# Patient Record
Sex: FEMALE | Race: BLACK OR AFRICAN AMERICAN | NOT HISPANIC OR LATINO
[De-identification: names, ages, dates, MRNs, and addresses within clinical notes are randomized per-mention and may not be internally consistent; named-entity substitution may affect disease eponyms.]

---

## 2017-09-19 ENCOUNTER — FORM ENCOUNTER (OUTPATIENT)
Age: 52
End: 2017-09-19

## 2018-09-19 ENCOUNTER — FORM ENCOUNTER (OUTPATIENT)
Age: 53
End: 2018-09-19

## 2018-12-20 ENCOUNTER — FORM ENCOUNTER (OUTPATIENT)
Age: 53
End: 2018-12-20

## 2021-04-15 PROBLEM — Z00.00 ENCOUNTER FOR PREVENTIVE HEALTH EXAMINATION: Status: ACTIVE | Noted: 2021-04-15

## 2021-04-23 ENCOUNTER — APPOINTMENT (OUTPATIENT)
Dept: NEUROSURGERY | Facility: CLINIC | Age: 56
End: 2021-04-23
Payer: COMMERCIAL

## 2021-04-23 VITALS
RESPIRATION RATE: 18 BRPM | TEMPERATURE: 97.5 F | WEIGHT: 140 LBS | OXYGEN SATURATION: 96 % | DIASTOLIC BLOOD PRESSURE: 94 MMHG | SYSTOLIC BLOOD PRESSURE: 143 MMHG | HEIGHT: 60 IN | BODY MASS INDEX: 27.48 KG/M2 | HEART RATE: 68 BPM

## 2021-04-23 DIAGNOSIS — R41.3 OTHER AMNESIA: ICD-10-CM

## 2021-04-23 DIAGNOSIS — Z80.9 FAMILY HISTORY OF MALIGNANT NEOPLASM, UNSPECIFIED: ICD-10-CM

## 2021-04-23 DIAGNOSIS — Z82.49 FAMILY HISTORY OF ISCHEMIC HEART DISEASE AND OTHER DISEASES OF THE CIRCULATORY SYSTEM: ICD-10-CM

## 2021-04-23 DIAGNOSIS — Z83.3 FAMILY HISTORY OF DIABETES MELLITUS: ICD-10-CM

## 2021-04-23 DIAGNOSIS — Z78.9 OTHER SPECIFIED HEALTH STATUS: ICD-10-CM

## 2021-04-23 DIAGNOSIS — R51.9 HEADACHE, UNSPECIFIED: ICD-10-CM

## 2021-04-23 DIAGNOSIS — R42 DIZZINESS AND GIDDINESS: ICD-10-CM

## 2021-04-23 PROCEDURE — 99213 OFFICE O/P EST LOW 20 MIN: CPT

## 2021-04-23 PROCEDURE — 99072 ADDL SUPL MATRL&STAF TM PHE: CPT

## 2021-04-23 RX ORDER — CALCIUM CITRATE/VITAMIN D3 315MG-6.25
TABLET ORAL
Refills: 0 | Status: ACTIVE | COMMUNITY

## 2021-04-23 NOTE — REVIEW OF SYSTEMS
[Chills] : chills [Feeling Tired] : feeling tired [As Noted in HPI] : as noted in HPI [Memory Lapses or Loss] : memory loss [Decr. Concentrating Ability] : decreased concentrating ability [Migraine Headache] : migraine headaches [Constipation] : constipation [Negative] : Heme/Lymph

## 2021-04-23 NOTE — ASSESSMENT
[FreeTextEntry1] : Given new and worsening severity of headaches affecting her work and quality of life, recommend MRI brain with and without contrast to rule out underlying compressive lesion.\par Recommend MRA Head without contrast to rule out vascular lesion \par After MRI brain and MRA head done, return to the office to review results.\par \par Instructed to follow up with ophthalmologist due to worsening RIGHT eye blurry vision - patient will call to schedule appointment at [facility].\par \par Education provided regarding headache prevention and treatments including good diet, adequate fluid intake, avoidance of stimulants caffeine and alcohol, stress reduction methods/techniques, and exercise.\par Instructed to report to ED or notify MD for any worsening neurological s/s.\par \par Patient verbalizes agreement and understanding with plan of care.\par

## 2021-04-23 NOTE — PHYSICAL EXAM
[General Appearance - Alert] : alert [General Appearance - In No Acute Distress] : in no acute distress [Oriented To Time, Place, And Person] : oriented to person, place, and time [Cranial Nerves Oculomotor (III)] : extraocular motion intact [Cranial Nerves Optic (II)] : visual acuity intact bilaterally,  pupils equal round and reactive to light [Cranial Nerves Trigeminal (V)] : facial sensation intact symmetrically [Cranial Nerves Facial (VII)] : face symmetrical [Cranial Nerves Vestibulocochlear (VIII)] : hearing was intact bilaterally [Cranial Nerves Glossopharyngeal (IX)] : tongue and palate midline [Cranial Nerves Hypoglossal (XII)] : there was no tongue deviation with protrusion [Cranial Nerves Accessory (XI - Cranial And Spinal)] : head turning and shoulder shrug symmetric [Motor Tone] : muscle tone was normal in all four extremities [Motor Strength] : muscle strength was normal in all four extremities [Sclera] : the sclera and conjunctiva were normal [Outer Ear] : the ears and nose were normal in appearance [Neck Appearance] : the appearance of the neck was normal [] : no respiratory distress [Abnormal Walk] : normal gait [Skin Color & Pigmentation] : normal skin color and pigmentation

## 2021-04-23 NOTE — HISTORY OF PRESENT ILLNESS
[de-identified] : 55 year old woman with no pertinent past medical history who presents today with chief complaint of worsening headaches. \par \par Ms. Hollis states that she has suffered from migraines for many years. However, over the last several months, she states headaches have been more severe and feel different than usual. She has been prescribed Relpax in the past, which she continues to take but it does not improve headaches. She describes headache as "frontal" and more on the RIGHT side of her head. Headaches are accompanied by dizziness/lightheadedness. Headaches are affecting her quality of life and she has been unable to work at times due to severity of headaches. She also reports RIGHT eye blurry vision. She saw an ophthalmologist a year and a half ago and was given glasses for correction but she states her RIGHT visual acuity has worsened over the past year. \par \par She denies positional headaches. Also reports fatigue.\par \par Denies nausea/vomiting. Denies focal weakness, changes in speech. She reports forgetfulness/short term memory loss and difficulty focusing.\par Denies trauma.

## 2021-04-28 ENCOUNTER — APPOINTMENT (OUTPATIENT)
Dept: MRI IMAGING | Facility: HOSPITAL | Age: 56
End: 2021-04-28
Payer: COMMERCIAL

## 2021-04-28 ENCOUNTER — RESULT REVIEW (OUTPATIENT)
Age: 56
End: 2021-04-28

## 2021-04-28 ENCOUNTER — OUTPATIENT (OUTPATIENT)
Dept: OUTPATIENT SERVICES | Facility: HOSPITAL | Age: 56
LOS: 1 days | End: 2021-04-28
Payer: COMMERCIAL

## 2021-04-28 PROCEDURE — 70544 MR ANGIOGRAPHY HEAD W/O DYE: CPT | Mod: 26,59

## 2021-04-28 PROCEDURE — A9585: CPT

## 2021-04-28 PROCEDURE — 70553 MRI BRAIN STEM W/O & W/DYE: CPT

## 2021-04-28 PROCEDURE — 70553 MRI BRAIN STEM W/O & W/DYE: CPT | Mod: 26

## 2021-04-28 PROCEDURE — 70544 MR ANGIOGRAPHY HEAD W/O DYE: CPT

## 2021-04-29 ENCOUNTER — APPOINTMENT (OUTPATIENT)
Dept: HEMATOLOGY ONCOLOGY | Facility: CLINIC | Age: 56
End: 2021-04-29

## 2021-04-29 ENCOUNTER — NON-APPOINTMENT (OUTPATIENT)
Age: 56
End: 2021-04-29

## 2021-05-27 ENCOUNTER — APPOINTMENT (OUTPATIENT)
Dept: OTOLARYNGOLOGY | Facility: CLINIC | Age: 56
End: 2021-05-27
Payer: COMMERCIAL

## 2021-05-27 DIAGNOSIS — Z87.19 PERSONAL HISTORY OF OTHER DISEASES OF THE DIGESTIVE SYSTEM: ICD-10-CM

## 2021-05-27 DIAGNOSIS — G43.909 MIGRAINE, UNSPECIFIED, NOT INTRACTABLE, W/OUT STATUS MIGRAINOSUS: ICD-10-CM

## 2021-05-27 DIAGNOSIS — K57.90 DIVERTICULOSIS OF INTESTINE, PART UNSPECIFIED, W/OUT PERFORATION OR ABSCESS W/OUT BLEEDING: ICD-10-CM

## 2021-05-27 PROCEDURE — 69210 REMOVE IMPACTED EAR WAX UNI: CPT

## 2021-05-27 PROCEDURE — 99204 OFFICE O/P NEW MOD 45 MIN: CPT | Mod: 25

## 2021-05-27 PROCEDURE — 99072 ADDL SUPL MATRL&STAF TM PHE: CPT

## 2021-05-27 RX ORDER — PANTOPRAZOLE 40 MG/1
40 TABLET, DELAYED RELEASE ORAL
Refills: 0 | Status: ACTIVE | COMMUNITY

## 2021-05-27 NOTE — HISTORY OF PRESENT ILLNESS
[de-identified] : 55F here for initial evaluation.\par \par Referred by Dr. Mason for incidentally found parotid lesions on MRI done for headaches.\par She c/o jaw pain over the past few months, made worse by chewing. She has difficulty at the dentist keeping her mouth open. She does not think she grinds her teeth.\par There is no facial weakness or numbness, no dry mouth, no difficulty eating, breathing, swallowing or talking.\par She also builds up ear wax.\par \par MRI Head 4/28/21:\par Partially imaged T2 hyperintense enhancing foci within the bilateral parotid gland, left greater than right, nonspecific, may represent prominent intraparotid lymph nodes versus parotid soft tissue lesions.\par \par ROS otherwise unremarkable.

## 2021-05-27 NOTE — CONSULT LETTER
[Dear  ___] : Dear  [unfilled], [Courtesy Letter:] : I had the pleasure of seeing your patient, [unfilled], in my office today. [Consult Closing:] : Thank you very much for allowing me to participate in the care of this patient.  If you have any questions, please do not hesitate to contact me. [Sincerely,] : Sincerely, [FreeTextEntry3] : Bari Ball MD\par Department of Otolaryngology - Head and Neck Surgery\par [facility]

## 2021-05-27 NOTE — DATA REVIEWED
[de-identified] : MRI Head 4/28/21:\par 55F here for initial evaluation.\par \par Referred by Dr. Mason for incidentally found parotid lesions on MRI done for headaches.\par She c/o jaw pain over the past few months, made worse by chewing. She has difficulty at the dentist keeping her mouth open. She does not think she grinds her teeth.\par There is no facial weakness or numbness, no dry mouth, no difficulty eating, breathing, swallowing or talking.\par She also builds up ear wax.\par \par MRI Head 4/28/21:\par Partially imaged T2 hyperintense enhancing foci within the bilateral parotid gland, left greater than right, nonspecific, may represent prominent intraparotid lymph nodes versus parotid soft tissue lesions.\par

## 2021-05-27 NOTE — PHYSICAL EXAM
[Midline] : trachea located in midline position [Normal] : no rashes [de-identified] : soft, no masses/lesions [de-identified] : soft [FreeTextEntry1] : Au: eac narrow w moderate cerumen removed w suction. TM intact and mobile, ME clear

## 2021-05-27 NOTE — ASSESSMENT
[FreeTextEntry1] : 55F referred by Dr. Mason for incidentally found parotid lesions on MRI done for headaches. She c/o jaw pain over the past few months, made worse by chewing. She has difficulty at the dentist keeping her mouth open. She does not think she grinds her teeth. There is no facial weakness or numbness, no dry mouth, no difficulty eating, breathing, swallowing or talking. Of note, she also builds up ear wax. MRI head shows partially imaged T2 hyperintense enhancing foci within the bilateral parotid gland, left greater than right, nonspecific, which may represent prominent intraparotid lymph nodes versus parotid soft tissue lesions.\par On exam, there is bilateral TMJ pain. Cerumen was removed from either ear w relief. The rest of the head and neck exam is unremarkable.\par Cerumen removed and felt better. Will send for CT neck to further characterize incidental parotid lesions and RTO thereafter.\par

## 2021-06-02 ENCOUNTER — APPOINTMENT (OUTPATIENT)
Dept: CT IMAGING | Facility: HOSPITAL | Age: 56
End: 2021-06-02

## 2021-07-02 ENCOUNTER — RESULT REVIEW (OUTPATIENT)
Age: 56
End: 2021-07-02

## 2021-07-02 ENCOUNTER — OUTPATIENT (OUTPATIENT)
Dept: OUTPATIENT SERVICES | Facility: HOSPITAL | Age: 56
LOS: 1 days | End: 2021-07-02

## 2021-07-02 ENCOUNTER — APPOINTMENT (OUTPATIENT)
Dept: CT IMAGING | Facility: CLINIC | Age: 56
End: 2021-07-02
Payer: COMMERCIAL

## 2021-07-02 PROCEDURE — 70491 CT SOFT TISSUE NECK W/DYE: CPT | Mod: 26

## 2021-07-23 ENCOUNTER — APPOINTMENT (OUTPATIENT)
Dept: OTOLARYNGOLOGY | Facility: CLINIC | Age: 56
End: 2021-07-23
Payer: COMMERCIAL

## 2021-07-23 VITALS
HEIGHT: 60 IN | WEIGHT: 140 LBS | DIASTOLIC BLOOD PRESSURE: 98 MMHG | BODY MASS INDEX: 27.48 KG/M2 | SYSTOLIC BLOOD PRESSURE: 127 MMHG | HEART RATE: 78 BPM | TEMPERATURE: 98.7 F

## 2021-07-23 DIAGNOSIS — K11.9 DISEASE OF SALIVARY GLAND, UNSPECIFIED: ICD-10-CM

## 2021-07-23 PROCEDURE — 99072 ADDL SUPL MATRL&STAF TM PHE: CPT

## 2021-07-23 PROCEDURE — 99214 OFFICE O/P EST MOD 30 MIN: CPT

## 2021-07-23 NOTE — ASSESSMENT
[FreeTextEntry1] : 55F referred by Dr. Mason for incidentally found parotid lesions on MRI done for headaches. There are no new issues since last seen 2 months ago. She c/o jaw pain over the past few months, made worse by chewing. She has difficulty at the dentist keeping her mouth open. She does not think she grinds her teeth. There is no facial weakness or numbness, no dry mouth, no difficulty eating, breathing, swallowing or talking. MRI head shows partially imaged T2 hyperintense enhancing foci within the bilateral parotid gland, left greater than right, nonspecific, which may represent prominent intraparotid lymph nodes versus parotid soft tissue lesions. CT Neck 7/2/21 shows no parotid mass or acute sialadenitis and findings on MR likely reflected small benign intraparotid lymph nodes not visible on CT. On exam, there is bilateral TMJ pain. The rest of the head and neck exam is unremarkable.\par Lesions on MRI are benign intraparotid lymph nodes; pt reasssured, as CT completely normal. For now, no further ENT intervention. RTO as needed.

## 2021-07-23 NOTE — DATA REVIEWED
[de-identified] : CT Neck 7/2/21:\par Findings:\par \par *  Aerodigestive Tract: No discrete nodular or masslike enhancement is identified. There is questionable asymmetric fullness of soft tissue at the left palatine tonsil inferiorly suggested on series 2, image 53 and series 601, image 33. This may be positional, as appearance is more symmetric on the delayed re-angled series #3 and 4, without masslike enhancement or heterogeneity to suggest inflammation/infection. The nasopharynx and larynx appear within normal limits. The oral cavity is grossly unremarkable allowing for dental streak artifact.\par \par *  Lymph Nodes: There are no pathologically enlarged lymph nodes seen on either side of the neck.\par \par *  Salivary Glands: The parotid glands appear within normal limits. There is no discrete intraparotid nodule or mass. Findings on MR are subtle and appear to reflect tiny intraglandular lymph nodes of no clinical concern. Incidentally, the submandibular glands are atrophic, perhaps developmental in etiology as the sublingual glands appear symmetrically prominent in size. No sialolithiasis nor evidence of acute sialadenitis.\par \par *  Thyroid Gland: Normal in size. There are a couple subcentimeter and punctate foci of hypodensity, too small to warrant further imaging investigation with ultrasound.\par \par *  Orbits: No abnormal soft tissue or mass effect.\par \par *  Brain: Limited visualization of the intracranial compartment shows no hydrocephalus or space-occupying lesion.\par \par *  Skull Base: Intact and unremarkable.\par \par *  Paranasal Sinuses: Well ventilated\par \par *  Mastoids: Well ventilated\par \par *  Cervical Spine: Unremarkable\par \par *  Lung Apices: Unremarkable\par \par \par IMPRESSION:\par \par No parotid mass or acute sialadenitis identified. Findings on MR most likely reflected small benign intraparotid lymph nodes not visible on CT.\par \par Questionable fullness at the left inferior tonsillar fossa, though without discrete enhancing mass or other heterogeneity. Correlate with direct inspection.\par \par Incidentally and likely a developmental finding, the submandibular glands are atrophic versus atretic and the sublingual glands are symmetrically hypertrophic.\par \par

## 2021-07-23 NOTE — HISTORY OF PRESENT ILLNESS
[de-identified] : 55F here in followup.\par \par There are no new issues since last seen 2 months ago. She was initially referred by Dr. Mason for incidentally found parotid lesions on MRI done for headaches. She c/o jaw pain over the past few months, made worse by chewing. She has difficulty at the dentist keeping her mouth open. She does not think she grinds her teeth.\par There is no facial weakness or numbness, no dry mouth, no difficulty eating, breathing, swallowing or talking.\par She also builds up ear wax.\par \par MRI Head 4/28/21:\par Partially imaged T2 hyperintense enhancing foci within the bilateral parotid gland, left greater than right, nonspecific, may represent prominent intraparotid lymph nodes versus parotid soft tissue lesions.\par \par CT Neck 7/2/21 (I reviewed images):\par -No parotid mass or acute sialadenitis identified. Findings on MR most likely reflected small benign intraparotid lymph nodes not visible on CT.\par \par ROS otherwise unremarkable.

## 2021-07-23 NOTE — PHYSICAL EXAM
[de-identified] : soft, no masses/lesions [de-identified] : soft [Midline] : trachea located in midline position [Normal] : no rashes [FreeTextEntry1] : Au: eac narrow w mild cerumen. TM intact and mobile, ME clear

## 2021-07-23 NOTE — CONSULT LETTER
[Dear  ___] : Dear  [unfilled], [Courtesy Letter:] : I had the pleasure of seeing your patient, [unfilled], in my office today. [Consult Closing:] : Thank you very much for allowing me to participate in the care of this patient.  If you have any questions, please do not hesitate to contact me. [Sincerely,] : Sincerely, [FreeTextEntry3] : Bari Ball MD\par Department of Otolaryngology - Head and Neck Surgery\par [facility]

## 2021-10-28 ENCOUNTER — TRANSCRIPTION ENCOUNTER (OUTPATIENT)
Age: 56
End: 2021-10-28

## 2021-10-29 ENCOUNTER — OUTPATIENT (OUTPATIENT)
Dept: OUTPATIENT SERVICES | Facility: HOSPITAL | Age: 56
LOS: 1 days | Discharge: HOME | End: 2021-10-29
Payer: COMMERCIAL

## 2021-10-29 ENCOUNTER — RESULT REVIEW (OUTPATIENT)
Age: 56
End: 2021-10-29

## 2021-10-29 PROCEDURE — 88305 TISSUE EXAM BY PATHOLOGIST: CPT | Mod: 26

## 2021-11-01 LAB — SURGICAL PATHOLOGY STUDY: SIGNIFICANT CHANGE UP

## 2021-12-30 ENCOUNTER — NON-APPOINTMENT (OUTPATIENT)
Age: 56
End: 2021-12-30

## 2022-02-09 ENCOUNTER — NON-APPOINTMENT (OUTPATIENT)
Age: 57
End: 2022-02-09

## 2022-02-09 ENCOUNTER — APPOINTMENT (OUTPATIENT)
Dept: BREAST CENTER | Facility: CLINIC | Age: 57
End: 2022-02-09
Payer: COMMERCIAL

## 2022-02-09 ENCOUNTER — RESULT REVIEW (OUTPATIENT)
Age: 57
End: 2022-02-09

## 2022-02-09 VITALS
WEIGHT: 148 LBS | HEIGHT: 60 IN | BODY MASS INDEX: 29.06 KG/M2 | DIASTOLIC BLOOD PRESSURE: 93 MMHG | HEART RATE: 125 BPM | SYSTOLIC BLOOD PRESSURE: 141 MMHG

## 2022-02-09 DIAGNOSIS — N60.02 SOLITARY CYST OF RIGHT BREAST: ICD-10-CM

## 2022-02-09 DIAGNOSIS — R92.8 OTHER ABNORMAL AND INCONCLUSIVE FINDINGS ON DIAGNOSTIC IMAGING OF BREAST: ICD-10-CM

## 2022-02-09 DIAGNOSIS — Z78.9 OTHER SPECIFIED HEALTH STATUS: ICD-10-CM

## 2022-02-09 DIAGNOSIS — N64.59 OTHER SIGNS AND SYMPTOMS IN BREAST: ICD-10-CM

## 2022-02-09 DIAGNOSIS — N60.01 SOLITARY CYST OF RIGHT BREAST: ICD-10-CM

## 2022-02-09 PROCEDURE — 19001 PUNCTURE ASPIR CYST BRST EA: CPT

## 2022-02-09 PROCEDURE — 99214 OFFICE O/P EST MOD 30 MIN: CPT | Mod: 25

## 2022-02-09 PROCEDURE — 19000 PUNCTURE ASPIR CYST BREAST: CPT

## 2022-02-09 RX ORDER — ELETRIPTAN HYDROBROMIDE 40 MG/1
40 TABLET, FILM COATED ORAL
Refills: 0 | Status: DISCONTINUED | COMMUNITY
End: 2022-02-09

## 2022-02-09 RX ORDER — UBROGEPANT 100 MG/1
TABLET ORAL
Refills: 0 | Status: ACTIVE | COMMUNITY

## 2022-02-14 ENCOUNTER — NON-APPOINTMENT (OUTPATIENT)
Age: 57
End: 2022-02-14

## 2022-07-13 ENCOUNTER — APPOINTMENT (OUTPATIENT)
Dept: NUCLEAR MEDICINE | Facility: HOSPITAL | Age: 57
End: 2022-07-13

## 2022-08-09 ENCOUNTER — OUTPATIENT (OUTPATIENT)
Dept: OUTPATIENT SERVICES | Facility: HOSPITAL | Age: 57
LOS: 1 days | End: 2022-08-09
Payer: COMMERCIAL

## 2022-08-09 ENCOUNTER — APPOINTMENT (OUTPATIENT)
Dept: NUCLEAR MEDICINE | Facility: HOSPITAL | Age: 57
End: 2022-08-09

## 2022-08-09 PROCEDURE — 78264 GASTRIC EMPTYING IMG STUDY: CPT | Mod: 26

## 2022-08-09 PROCEDURE — 78264 GASTRIC EMPTYING IMG STUDY: CPT

## 2022-08-09 PROCEDURE — A9541: CPT

## 2024-01-02 PROBLEM — Z78.9 CAFFEINE USE: Status: ACTIVE | Noted: 2024-01-02

## 2024-01-03 ENCOUNTER — APPOINTMENT (OUTPATIENT)
Dept: GASTROENTEROLOGY | Facility: CLINIC | Age: 59
End: 2024-01-03
Payer: COMMERCIAL

## 2024-01-03 DIAGNOSIS — Z78.9 OTHER SPECIFIED HEALTH STATUS: ICD-10-CM

## 2024-01-03 PROCEDURE — 99443: CPT

## 2024-01-03 RX ORDER — CHROMIUM 200 MCG
TABLET ORAL
Refills: 0 | Status: ACTIVE | COMMUNITY

## 2024-01-03 RX ORDER — PANCRELIPASE LIPASE, PANCRELIPASE PROTEASE, PANCRELIPASE AMYLASE 252600; 60000; 189600 [USP'U]/1; [USP'U]/1; [USP'U]/1
CAPSULE, DELAYED RELEASE ORAL
Refills: 0 | Status: ACTIVE | COMMUNITY

## 2024-01-17 ENCOUNTER — APPOINTMENT (OUTPATIENT)
Dept: GASTROENTEROLOGY | Facility: CLINIC | Age: 59
End: 2024-01-17
Payer: COMMERCIAL

## 2024-01-17 VITALS — BODY MASS INDEX: 27.48 KG/M2 | WEIGHT: 140 LBS | HEIGHT: 60 IN

## 2024-01-17 DIAGNOSIS — R10.9 UNSPECIFIED ABDOMINAL PAIN: ICD-10-CM

## 2024-01-17 DIAGNOSIS — K83.4 SPASM OF SPHINCTER OF ODDI: ICD-10-CM

## 2024-01-17 PROCEDURE — 99214 OFFICE O/P EST MOD 30 MIN: CPT

## 2024-01-17 RX ORDER — HYOSCYAMINE SULFATE 0.12 MG/1
0.12 TABLET SUBLINGUAL 3 TIMES DAILY
Qty: 90 | Refills: 0 | Status: ACTIVE | COMMUNITY
Start: 2024-01-17 | End: 1900-01-01

## 2024-01-17 NOTE — HISTORY OF PRESENT ILLNESS
[FreeTextEntry1] : 58 yr old female who was referred here by Dr. Thomas for an EUS of the upper abdomen to further assess abdominal pain. She stated that she has been experiencing periodic upper abdominal pain over the past 2 years, described as sometimes sharp, squeezing, and stabbing pain. She last had the pain a week ago, not as severe as in August which required hospitalization in [place]. She had an US of Abdomen, CT Scan, HIDA scan, MRCP which apparently were all unremarkable. However, the pain in August was associated with nausea, vomiting, and increased LFTs. .

## 2024-01-17 NOTE — ASSESSMENT
[FreeTextEntry1] : Patient is a 59 y/o female who was referred here by Dr. Rios for an EUS of the upper abdomen to further assess abdominal pain. She stated that she has been experiencing periodic upper abdominal pain over the past 2 years, described as sometimes sharp, squeezing, and stabbing pain. She last had the pain a week ago, not as severe as in August which required hospitalization in [place]. She had an US of Abdomen, CT Scan, HIDA scan, MRCP which apparently were all unremarkable. She had stools studies suggestive of EPI as noted in Dr rios records. Currently she is without any issues.   Intermittent Upper abdominal pain over the past 2 yrs with increased LFTs 8/23, ?Sphincter of Juan Daniel - MRI, U/S and labs/imaging reviewed - Clinically well appearing - EPI on stool studies   - Attack are infrequent- Trial of Levsin - If attacks persist will plan ERCP- f/U 6 months

## 2024-01-17 NOTE — ASSESSMENT
[FreeTextEntry1] : 58 yr old female who was referred here by Dr. Thomas for an EUS of the upper abdomen to further assess abdominal pain. She stated that she has been experiencing periodic upper abdominal pain over the past 2 years, described as sometimes sharp, squeezing, and stabbing pain. She last had the pain a week ago, not as severe as in August which required hospitalization in [place]. She had an US of Abdomen, CT Scan, HIDA scan, MRCP which apparently were all unremarkable. However, the pain in August was associated with nausea, vomiting, and increased LFTs.   Intermittent Upper abdominal pain over the past 2 yrs with increased LFTs 8/23, ?Sphincter of Juan Daniel - Need to get reports of the MRCP, Capsule from 10/20/23, HIDA scan, labs, and last medical note from Dr. Thomas - Apparently, all testing to date has been unrevealing - RTC in person in 2-3 weeks due to the complicated nature of thi case

## 2024-01-17 NOTE — REVIEW OF SYSTEMS
[Abdominal Pain] : abdominal pain [Vomiting] : vomiting [Negative] : Heme/Lymph [Constipation] : no constipation [Diarrhea] : no diarrhea [Heartburn] : no heartburn [Melena (black stool)] : no melena [Bleeding] : no bleeding [Fecal Incontinence (soiling)] : no fecal incontinence [Bloating (gassiness)] : no bloating

## 2024-01-17 NOTE — REASON FOR VISIT
[Home] : at home, [unfilled] , at the time of the visit. [Medical Office: ([facility])___] : at the medical office located in  [Verbal consent obtained from patient] : the patient, [unfilled] [Consultation] : a consultation visit [FreeTextEntry4] : Valerie [FreeTextEntry1] : NP - Ref by Dr. Thomas for EUS

## 2024-01-17 NOTE — REVIEW OF SYSTEMS
[Abdominal Pain] : abdominal pain [Vomiting] : vomiting [Negative] : Heme/Lymph [Constipation] : no constipation [Diarrhea] : no diarrhea [Heartburn] : no heartburn [Melena (black stool)] : no melena [Fecal Incontinence (soiling)] : no fecal incontinence [Swollen Glands] : no swollen glands

## 2024-01-25 ENCOUNTER — APPOINTMENT (OUTPATIENT)
Dept: SURGICAL ONCOLOGY | Facility: CLINIC | Age: 59
End: 2024-01-25

## 2024-05-24 ENCOUNTER — APPOINTMENT (OUTPATIENT)
Dept: OTOLARYNGOLOGY | Facility: CLINIC | Age: 59
End: 2024-05-24
Payer: COMMERCIAL

## 2024-05-24 VITALS
HEART RATE: 81 BPM | WEIGHT: 136 LBS | DIASTOLIC BLOOD PRESSURE: 101 MMHG | HEIGHT: 60 IN | SYSTOLIC BLOOD PRESSURE: 139 MMHG | BODY MASS INDEX: 26.7 KG/M2 | TEMPERATURE: 97.3 F

## 2024-05-24 DIAGNOSIS — H61.23 IMPACTED CERUMEN, BILATERAL: ICD-10-CM

## 2024-05-24 PROCEDURE — 99213 OFFICE O/P EST LOW 20 MIN: CPT | Mod: 25

## 2024-05-24 NOTE — PHYSICAL EXAM
[de-identified] : soft, no masses/lesions [de-identified] : soft [de-identified] : ++pain on palpation over left side [Midline] : trachea located in midline position [Normal] : no rashes [FreeTextEntry1] : Au: eac narrow w mild cerumen removed w curet. TM intact and mobile, ME clear

## 2024-05-24 NOTE — DATA REVIEWED
[de-identified] : CT Neck 7/2/21:\par  Findings:\par  \par  *  Aerodigestive Tract: No discrete nodular or masslike enhancement is identified. There is questionable asymmetric fullness of soft tissue at the left palatine tonsil inferiorly suggested on series 2, image 53 and series 601, image 33. This may be positional, as appearance is more symmetric on the delayed re-angled series #3 and 4, without masslike enhancement or heterogeneity to suggest inflammation/infection. The nasopharynx and larynx appear within normal limits. The oral cavity is grossly unremarkable allowing for dental streak artifact.\par  \par  *  Lymph Nodes: There are no pathologically enlarged lymph nodes seen on either side of the neck.\par  \par  *  Salivary Glands: The parotid glands appear within normal limits. There is no discrete intraparotid nodule or mass. Findings on MR are subtle and appear to reflect tiny intraglandular lymph nodes of no clinical concern. Incidentally, the submandibular glands are atrophic, perhaps developmental in etiology as the sublingual glands appear symmetrically prominent in size. No sialolithiasis nor evidence of acute sialadenitis.\par  \par  *  Thyroid Gland: Normal in size. There are a couple subcentimeter and punctate foci of hypodensity, too small to warrant further imaging investigation with ultrasound.\par  \par  *  Orbits: No abnormal soft tissue or mass effect.\par  \par  *  Brain: Limited visualization of the intracranial compartment shows no hydrocephalus or space-occupying lesion.\par  \par  *  Skull Base: Intact and unremarkable.\par  \par  *  Paranasal Sinuses: Well ventilated\par  \par  *  Mastoids: Well ventilated\par  \par  *  Cervical Spine: Unremarkable\par  \par  *  Lung Apices: Unremarkable\par  \par  \par  IMPRESSION:\par  \par  No parotid mass or acute sialadenitis identified. Findings on MR most likely reflected small benign intraparotid lymph nodes not visible on CT.\par  \par  Questionable fullness at the left inferior tonsillar fossa, though without discrete enhancing mass or other heterogeneity. Correlate with direct inspection.\par  \par  Incidentally and likely a developmental finding, the submandibular glands are atrophic versus atretic and the sublingual glands are symmetrically hypertrophic.\par  \par

## 2024-05-24 NOTE — CONSULT LETTER
[Dear  ___] : Dear  [unfilled], [Courtesy Letter:] : I had the pleasure of seeing your patient, [unfilled], in my office today. [Consult Closing:] : Thank you very much for allowing me to participate in the care of this patient.  If you have any questions, please do not hesitate to contact me. [Sincerely,] : Sincerely, [FreeTextEntry3] : Bari Ball MD\par  Department of Otolaryngology - Head and Neck Surgery\par  [facility]

## 2024-05-24 NOTE — ASSESSMENT
[FreeTextEntry1] : 58F here in followup. She c/o left sided jaw and ear pain. This affects her opening her jaw and makes dental work difficult and the pain is made worse by chewing. Her dentist recommended a . There is also clicking on the left side when opening the jaw. There is no facial weakness or numbness, no dry mouth, no difficulty eating, breathing, swallowing or talking. She also builds up ear wax. On exam, there is left sided TMJ pain. Cerumen was removed from both EACs. The rest of the head and neck exam is unremarkable. -TMJ pain - first try conservative measures w soft diet, NSAIDs, compresses, massage and nightguard. If still sx will send to [facility]. -Cerumen removed

## 2024-05-24 NOTE — HISTORY OF PRESENT ILLNESS
[de-identified] : 58F here in followup.  She c/o left sided jaw and ear pain. This affects her opening her jaw and makes dental work difficult and the pain is made worse by chewing. Her dentist recommended a . There is also clicking on the left side when opening the jaw. There is no facial weakness or numbness, no dry mouth, no difficulty eating, breathing, swallowing or talking. She also builds up ear wax.  MRI Head 4/28/21: Partially imaged T2 hyperintense enhancing foci within the bilateral parotid gland, left greater than right, nonspecific, may represent prominent intraparotid lymph nodes versus parotid soft tissue lesions.  CT Neck 7/2/21 (I reviewed images): -No parotid mass or acute sialadenitis identified. Findings on MR most likely reflected small benign intraparotid lymph nodes not visible on CT.  ROS otherwise unremarkable.

## 2024-06-11 ENCOUNTER — APPOINTMENT (OUTPATIENT)
Dept: OTOLARYNGOLOGY | Facility: CLINIC | Age: 59
End: 2024-06-11
Payer: COMMERCIAL

## 2024-06-11 VITALS
HEART RATE: 71 BPM | DIASTOLIC BLOOD PRESSURE: 82 MMHG | OXYGEN SATURATION: 97 % | BODY MASS INDEX: 26.7 KG/M2 | HEIGHT: 60 IN | TEMPERATURE: 96 F | WEIGHT: 136 LBS | SYSTOLIC BLOOD PRESSURE: 125 MMHG

## 2024-06-11 DIAGNOSIS — M26.609 UNSPECIFIED TEMPOROMANDIBULAR JOINT DISORDER: ICD-10-CM

## 2024-06-11 PROCEDURE — 99213 OFFICE O/P EST LOW 20 MIN: CPT

## 2024-06-11 RX ORDER — OLMESARTAN MEDOXOMIL AND HYDROCHLOROTHIAZIDE 40; 25 MG/1; MG/1
TABLET ORAL
Refills: 0 | Status: ACTIVE | COMMUNITY

## 2024-06-11 RX ORDER — AMLODIPINE BESYLATE 2.5 MG/1
2.5 TABLET ORAL
Refills: 0 | Status: DISCONTINUED | COMMUNITY
End: 2024-06-11

## 2024-06-13 NOTE — HISTORY OF PRESENT ILLNESS
[de-identified] : 6/11/24: 58F here complaining of jaw pain. She is having left sided jaw pain that gets worse when she eats and some days she feels like she cannot eat unless she takes tylenol. She also has clicking on the left side when she opens her mouth and does have some ear pain and throat pain as well on that side. No recent dental work. She does have some limitations when opening which vary depending on her symptoms.

## 2024-06-13 NOTE — PHYSICAL EXAM
[de-identified] : +left clicking, masseter tenderness, limited ROM [Normal] : mucosa is normal [de-identified] : Does have some limitations in opening today pain involving left masseter muscle and left temporal region.

## 2024-06-26 ENCOUNTER — APPOINTMENT (OUTPATIENT)
Dept: MRI IMAGING | Facility: HOSPITAL | Age: 59
End: 2024-06-26

## 2024-07-23 PROBLEM — R13.10 DIFFICULTY SWALLOWING: Status: ACTIVE | Noted: 2024-07-23

## 2024-07-23 NOTE — REASON FOR VISIT
[Home] : at home, [unfilled] , at the time of the visit. [Medical Office: ([facility])___] : at the medical office located in  [Verbal consent obtained from patient] : the patient, [unfilled] [FreeTextEntry4] : Valerie [Follow-up] : a follow-up of an existing diagnosis

## 2024-07-24 ENCOUNTER — APPOINTMENT (OUTPATIENT)
Dept: GASTROENTEROLOGY | Facility: CLINIC | Age: 59
End: 2024-07-24
Payer: COMMERCIAL

## 2024-07-24 DIAGNOSIS — R10.9 UNSPECIFIED ABDOMINAL PAIN: ICD-10-CM

## 2024-07-24 PROCEDURE — 99442: CPT

## 2024-07-24 NOTE — HISTORY OF PRESENT ILLNESS
[FreeTextEntry1] : Patient is a 59 y/o female who was referred here by Dr. Thomas for an EUS of the upper abdomen to further assess abdominal pain. She stated that she has been experiencing periodic upper abdominal pain over the past 2 years, described as sometimes sharp, squeezing, and stabbing pain. She last had the pain a week ago, not as severe as in August which required hospitalization in [place]. She had an US of Abdomen, CT Scan, HIDA scan, MRCP which apparently were all unremarkable. She had stools studies suggestive of EPI as noted in Dr Thomas records. Patient notes since last visit still having pain. she was given Levsin which helped. She notes she is averaging 3 times weekly.

## 2024-07-24 NOTE — REVIEW OF SYSTEMS
[Constipation] : no constipation [Diarrhea] : no diarrhea [Heartburn] : no heartburn [Melena (black stool)] : no melena [Fecal Incontinence (soiling)] : no fecal incontinence [Swollen Glands] : no swollen glands

## 2024-07-24 NOTE — ASSESSMENT
[FreeTextEntry1] : Patient is a 57 y/o female who was referred here by Dr. Thomas for an EUS of the upper abdomen to further assess abdominal pain. She stated that she has been experiencing periodic upper abdominal pain over the past 2 years, described as sometimes sharp, squeezing, and stabbing pain. She last had the pain a week ago, not as severe as in August which required hospitalization in [place]. She had an US of Abdomen, CT Scan, HIDA scan, MRCP which apparently were all unremarkable. She had stools studies suggestive of EPI as noted in Dr Thomas records. Currently she is without any issues.   Intermittent Upper abdominal pain over the past 2 yrs with increased LFTs 8/23, ?Sphincter of Juan Daniel - Continue Levsin - No longer on Pantoprazole  - She will journal attacks- would like to hold off on procedure currently  - CMP, Amylase, Lipase ordered  - Follow up 2-3 months

## 2024-08-06 ENCOUNTER — APPOINTMENT (OUTPATIENT)
Dept: OTOLARYNGOLOGY | Facility: CLINIC | Age: 59
End: 2024-08-06

## 2024-08-06 PROCEDURE — 99214 OFFICE O/P EST MOD 30 MIN: CPT

## 2024-08-07 NOTE — DATA REVIEWED
[de-identified] : Open/Closed MRI does not reveal any significant OA of the joints but the left side disc is displaced posteriorly and does not reduce. Likely the anterior disc ligament is impinged leading to her pain and some opening limitation.

## 2024-08-07 NOTE — DATA REVIEWED
[de-identified] : Open/Closed MRI does not reveal any significant OA of the joints but the left side disc is displaced posteriorly and does not reduce. Likely the anterior disc ligament is impinged leading to her pain and some opening limitation.

## 2024-08-07 NOTE — HISTORY OF PRESENT ILLNESS
[de-identified] : 6/11/24: 58F here complaining of jaw pain. She is having left sided jaw pain that gets worse when she eats and some days she feels like she cannot eat unless she takes tylenol. She also has clicking on the left side when she opens her mouth and does have some ear pain and throat pain as well on that side. No recent dental work. She does have some limitations when opening which vary depending on her symptoms. [FreeTextEntry1] : 8/6/24: Patient returns today to review recent MR of TMJ.

## 2024-08-07 NOTE — PHYSICAL EXAM
[de-identified] : +left clicking, masseter tenderness, limited ROM [de-identified] : Does have some limitations in opening today pain involving left masseter muscle and left. No significant pain temporal region.  No significant deviation on open. [Normal] : no rashes

## 2024-08-07 NOTE — PHYSICAL EXAM
[de-identified] : +left clicking, masseter tenderness, limited ROM [de-identified] : Does have some limitations in opening today pain involving left masseter muscle and left. No significant pain temporal region.  No significant deviation on open. [Normal] : no rashes

## 2024-08-07 NOTE — HISTORY OF PRESENT ILLNESS
[de-identified] : 6/11/24: 58F here complaining of jaw pain. She is having left sided jaw pain that gets worse when she eats and some days she feels like she cannot eat unless she takes tylenol. She also has clicking on the left side when she opens her mouth and does have some ear pain and throat pain as well on that side. No recent dental work. She does have some limitations when opening which vary depending on her symptoms. [FreeTextEntry1] : 8/6/24: Patient returns today to review recent MR of TMJ.

## 2024-09-04 DIAGNOSIS — K21.9 GASTRO-ESOPHAGEAL REFLUX DISEASE W/OUT ESOPHAGITIS: ICD-10-CM

## 2024-09-04 RX ORDER — PANTOPRAZOLE 40 MG/1
40 TABLET, DELAYED RELEASE ORAL
Qty: 90 | Refills: 2 | Status: ACTIVE | COMMUNITY
Start: 2024-09-04 | End: 1900-01-01

## 2024-09-18 DIAGNOSIS — K86.89 OTHER SPECIFIED DISEASES OF PANCREAS: ICD-10-CM

## 2024-10-23 ENCOUNTER — APPOINTMENT (OUTPATIENT)
Dept: GASTROENTEROLOGY | Facility: CLINIC | Age: 59
End: 2024-10-23

## 2024-10-23 VITALS — BODY MASS INDEX: 27.48 KG/M2 | WEIGHT: 140 LBS | HEIGHT: 60 IN

## 2024-10-23 DIAGNOSIS — Z78.9 OTHER SPECIFIED HEALTH STATUS: ICD-10-CM

## 2024-10-23 DIAGNOSIS — K21.9 GASTRO-ESOPHAGEAL REFLUX DISEASE W/OUT ESOPHAGITIS: ICD-10-CM

## 2024-10-23 DIAGNOSIS — R10.9 UNSPECIFIED ABDOMINAL PAIN: ICD-10-CM

## 2024-10-23 DIAGNOSIS — K59.00 CONSTIPATION, UNSPECIFIED: ICD-10-CM

## 2024-10-23 DIAGNOSIS — R13.10 DYSPHAGIA, UNSPECIFIED: ICD-10-CM

## 2024-10-23 DIAGNOSIS — S03.00XA DISLOCATION OF JAW, UNSPECIFIED SIDE, INITIAL ENCOUNTER: ICD-10-CM

## 2024-10-23 PROCEDURE — 99213 OFFICE O/P EST LOW 20 MIN: CPT

## 2024-10-23 RX ORDER — OLMESARTAN MEDOXOMIL 20 MG/1
20 TABLET, FILM COATED ORAL
Refills: 0 | Status: ACTIVE | COMMUNITY

## 2024-10-23 RX ORDER — UBROGEPANT 100 MG/1
100 TABLET ORAL
Refills: 0 | Status: ACTIVE | COMMUNITY

## 2025-02-27 ENCOUNTER — RESULT REVIEW (OUTPATIENT)
Age: 60
End: 2025-02-27

## 2025-02-27 ENCOUNTER — OUTPATIENT (OUTPATIENT)
Dept: OUTPATIENT SERVICES | Facility: HOSPITAL | Age: 60
LOS: 1 days | Discharge: HOME | End: 2025-02-27
Payer: COMMERCIAL

## 2025-02-27 ENCOUNTER — TRANSCRIPTION ENCOUNTER (OUTPATIENT)
Age: 60
End: 2025-02-27

## 2025-02-27 VITALS
WEIGHT: 139.99 LBS | TEMPERATURE: 98 F | SYSTOLIC BLOOD PRESSURE: 146 MMHG | OXYGEN SATURATION: 100 % | DIASTOLIC BLOOD PRESSURE: 78 MMHG | HEART RATE: 63 BPM | RESPIRATION RATE: 18 BRPM

## 2025-02-27 VITALS
HEART RATE: 68 BPM | OXYGEN SATURATION: 100 % | DIASTOLIC BLOOD PRESSURE: 87 MMHG | SYSTOLIC BLOOD PRESSURE: 145 MMHG | RESPIRATION RATE: 18 BRPM

## 2025-02-27 DIAGNOSIS — I10 ESSENTIAL (PRIMARY) HYPERTENSION: ICD-10-CM

## 2025-02-27 DIAGNOSIS — K29.50 UNSPECIFIED CHRONIC GASTRITIS WITHOUT BLEEDING: ICD-10-CM

## 2025-02-27 DIAGNOSIS — Z98.890 OTHER SPECIFIED POSTPROCEDURAL STATES: Chronic | ICD-10-CM

## 2025-02-27 DIAGNOSIS — K25.9 GASTRIC ULCER, UNSPECIFIED AS ACUTE OR CHRONIC, WITHOUT HEMORRHAGE OR PERFORATION: ICD-10-CM

## 2025-02-27 DIAGNOSIS — Z98.891 HISTORY OF UTERINE SCAR FROM PREVIOUS SURGERY: Chronic | ICD-10-CM

## 2025-02-27 DIAGNOSIS — Z98.51 TUBAL LIGATION STATUS: Chronic | ICD-10-CM

## 2025-02-27 DIAGNOSIS — R10.11 RIGHT UPPER QUADRANT PAIN: ICD-10-CM

## 2025-02-27 DIAGNOSIS — I47.10 SUPRAVENTRICULAR TACHYCARDIA, UNSPECIFIED: ICD-10-CM

## 2025-02-27 DIAGNOSIS — G43.909 MIGRAINE, UNSPECIFIED, NOT INTRACTABLE, WITHOUT STATUS MIGRAINOSUS: ICD-10-CM

## 2025-02-27 DIAGNOSIS — K21.9 GASTRO-ESOPHAGEAL REFLUX DISEASE WITHOUT ESOPHAGITIS: ICD-10-CM

## 2025-02-27 DIAGNOSIS — R13.10 DYSPHAGIA, UNSPECIFIED: ICD-10-CM

## 2025-02-27 PROCEDURE — 88312 SPECIAL STAINS GROUP 1: CPT | Mod: 26

## 2025-02-27 PROCEDURE — 88312 SPECIAL STAINS GROUP 1: CPT

## 2025-02-27 PROCEDURE — 43239 EGD BIOPSY SINGLE/MULTIPLE: CPT | Mod: XU

## 2025-02-27 PROCEDURE — 88305 TISSUE EXAM BY PATHOLOGIST: CPT | Mod: 26

## 2025-02-27 PROCEDURE — 43237 ENDOSCOPIC US EXAM ESOPH: CPT

## 2025-02-27 PROCEDURE — 88305 TISSUE EXAM BY PATHOLOGIST: CPT

## 2025-02-27 RX ORDER — OLMESARTAN MEDOXOMIL 5 MG/1
1 TABLET, FILM COATED ORAL
Refills: 0 | DISCHARGE

## 2025-02-27 NOTE — ASU DISCHARGE PLAN (ADULT/PEDIATRIC) - FINANCIAL ASSISTANCE
[facility] provides services at a reduced cost to those who are determined to be eligible through [facility]’s financial assistance program. Information regarding [facility]’s financial assistance program can be found by going to [email or web address] or by calling [phone].

## 2025-02-27 NOTE — ASU PATIENT PROFILE, ADULT - NSICDXPASTMEDICALHX_GEN_ALL_CORE_FT
PAST MEDICAL HISTORY:  Chronic GERD     HTN (hypertension)     Migraines     SVT (supraventricular tachycardia)

## 2025-02-27 NOTE — ASU PATIENT PROFILE, ADULT - FALL HARM RISK - UNIVERSAL INTERVENTIONS
Bed in lowest position, wheels locked, appropriate side rails in place/Call bell, personal items and telephone in reach/Instruct patient to call for assistance before getting out of bed or chair/Non-slip footwear when patient is out of bed/[place] to call system/Purposeful Proactive Rounding/Room/bathroom lighting operational, light cord in reach

## 2025-03-14 PROBLEM — G43.909 MIGRAINE, UNSPECIFIED, NOT INTRACTABLE, WITHOUT STATUS MIGRAINOSUS: Chronic | Status: ACTIVE | Noted: 2025-02-27

## 2025-03-14 PROBLEM — K21.9 GASTRO-ESOPHAGEAL REFLUX DISEASE WITHOUT ESOPHAGITIS: Chronic | Status: ACTIVE | Noted: 2025-02-27

## 2025-03-14 PROBLEM — I47.10 SUPRAVENTRICULAR TACHYCARDIA, UNSPECIFIED: Chronic | Status: ACTIVE | Noted: 2025-02-27

## 2025-03-14 PROBLEM — I10 ESSENTIAL (PRIMARY) HYPERTENSION: Chronic | Status: ACTIVE | Noted: 2025-02-27

## 2025-03-19 ENCOUNTER — APPOINTMENT (OUTPATIENT)
Dept: GASTROENTEROLOGY | Facility: CLINIC | Age: 60
End: 2025-03-19

## 2025-03-19 DIAGNOSIS — R13.10 DYSPHAGIA, UNSPECIFIED: ICD-10-CM

## 2025-03-19 DIAGNOSIS — K21.9 GASTRO-ESOPHAGEAL REFLUX DISEASE W/OUT ESOPHAGITIS: ICD-10-CM

## 2025-03-19 DIAGNOSIS — K25.9 GASTRIC ULCER, UNSPECIFIED AS ACUTE OR CHRONIC, W/OUT HEMORRHAGE OR PERFORATION: ICD-10-CM

## 2025-03-19 DIAGNOSIS — Z78.9 OTHER SPECIFIED HEALTH STATUS: ICD-10-CM

## 2025-03-19 DIAGNOSIS — K59.00 CONSTIPATION, UNSPECIFIED: ICD-10-CM

## 2025-03-19 DIAGNOSIS — R10.9 UNSPECIFIED ABDOMINAL PAIN: ICD-10-CM

## 2025-03-19 DIAGNOSIS — K22.4 DYSKINESIA OF ESOPHAGUS: ICD-10-CM

## 2025-03-19 DIAGNOSIS — Q45.3 OTHER CONGENITAL MALFORMATIONS OF PANCREAS AND PANCREATIC DUCT: ICD-10-CM

## 2025-03-19 DIAGNOSIS — S03.00XA DISLOCATION OF JAW, UNSPECIFIED SIDE, INITIAL ENCOUNTER: ICD-10-CM

## 2025-03-19 PROCEDURE — 99213 OFFICE O/P EST LOW 20 MIN: CPT | Mod: 93

## 2025-03-19 RX ORDER — VONOPRAZAN FUMARATE 26.72 MG/1
20 TABLET ORAL
Qty: 30 | Refills: 5 | Status: ACTIVE | COMMUNITY
Start: 2025-03-19 | End: 1900-01-01

## 2025-04-23 ENCOUNTER — NON-APPOINTMENT (OUTPATIENT)
Age: 60
End: 2025-04-23

## 2025-05-08 ENCOUNTER — TRANSCRIPTION ENCOUNTER (OUTPATIENT)
Age: 60
End: 2025-05-08

## 2025-05-08 ENCOUNTER — OUTPATIENT (OUTPATIENT)
Dept: OUTPATIENT SERVICES | Facility: HOSPITAL | Age: 60
LOS: 1 days | Discharge: HOME | End: 2025-05-08

## 2025-05-08 VITALS
SYSTOLIC BLOOD PRESSURE: 107 MMHG | WEIGHT: 134.92 LBS | HEART RATE: 67 BPM | OXYGEN SATURATION: 99 % | DIASTOLIC BLOOD PRESSURE: 69 MMHG | RESPIRATION RATE: 20 BRPM | TEMPERATURE: 97 F

## 2025-05-08 DIAGNOSIS — Z98.890 OTHER SPECIFIED POSTPROCEDURAL STATES: Chronic | ICD-10-CM

## 2025-05-08 DIAGNOSIS — K21.9 GASTRO-ESOPHAGEAL REFLUX DISEASE WITHOUT ESOPHAGITIS: ICD-10-CM

## 2025-05-08 DIAGNOSIS — Z98.51 TUBAL LIGATION STATUS: Chronic | ICD-10-CM

## 2025-05-08 DIAGNOSIS — Z98.891 HISTORY OF UTERINE SCAR FROM PREVIOUS SURGERY: Chronic | ICD-10-CM

## 2025-05-08 RX ORDER — UBROGEPANT 50 MG/1
1 TABLET ORAL
Refills: 0 | DISCHARGE

## 2025-05-08 RX ORDER — LIPASE/PROTEASE/AMYLASE 10K-37.5K
1 CAPSULE,DELAYED RELEASE (ENTERIC COATED) ORAL
Refills: 0 | DISCHARGE

## 2025-05-08 RX ORDER — VONOPRAZAN FUMARATE AND AMOXICILLIN 20MG-500MG
0 KIT ORAL
Refills: 0 | DISCHARGE

## 2025-05-08 NOTE — ASU PATIENT PROFILE, ADULT - TEACHING/LEARNING FACTORS IMPACT ABILITY TO LEARN
1. Caller Name: myah                                             Call Back Number: [phone] (home)         Patient approves a detailed voicemail message: no    patient would like to know if PCP is going to order labs prior to her appointment. Patient states that she can have them done 7/12/19. Please advise.     none

## 2025-05-08 NOTE — ASU PREOP CHECKLIST - NS PREOP CHK HIBICLENS NA
Hello, Thankyou for your note and I am aware that pt had appt today with Shauna Burrows.  I will Addend request per Shauna Burrows and Dr Joyner advisement:    Please re-schedule appt with Shauna Burrows two weeks out AND    Dr Joyner in 4 weeks    **If you can please schedule ASAP as I would like to give pt his appt slips prior to him being discharged from [facility] today.**     Thankyou!    Gayla Killian RN       N/A

## 2025-05-08 NOTE — ASU PATIENT PROFILE, ADULT - NSICDXPASTMEDICALHX_GEN_ALL_CORE_FT
PAST MEDICAL HISTORY:  Chronic GERD     HTN (hypertension)     Migraines     Pancreas divisum     SVT (supraventricular tachycardia)

## 2025-06-04 ENCOUNTER — APPOINTMENT (OUTPATIENT)
Dept: GASTROENTEROLOGY | Facility: CLINIC | Age: 60
End: 2025-06-04
Payer: COMMERCIAL

## 2025-06-04 ENCOUNTER — NON-APPOINTMENT (OUTPATIENT)
Age: 60
End: 2025-06-04

## 2025-06-04 VITALS
WEIGHT: 142 LBS | DIASTOLIC BLOOD PRESSURE: 92 MMHG | SYSTOLIC BLOOD PRESSURE: 132 MMHG | BODY MASS INDEX: 27.88 KG/M2 | HEART RATE: 76 BPM | HEIGHT: 60 IN

## 2025-06-04 DIAGNOSIS — Z78.9 OTHER SPECIFIED HEALTH STATUS: ICD-10-CM

## 2025-06-04 DIAGNOSIS — K22.4 DYSKINESIA OF ESOPHAGUS: ICD-10-CM

## 2025-06-04 DIAGNOSIS — R13.10 DYSPHAGIA, UNSPECIFIED: ICD-10-CM

## 2025-06-04 DIAGNOSIS — K59.00 CONSTIPATION, UNSPECIFIED: ICD-10-CM

## 2025-06-04 DIAGNOSIS — R11.10 VOMITING, UNSPECIFIED: ICD-10-CM

## 2025-06-04 DIAGNOSIS — Z86.79 PERSONAL HISTORY OF OTHER DISEASES OF THE CIRCULATORY SYSTEM: ICD-10-CM

## 2025-06-04 DIAGNOSIS — R10.9 UNSPECIFIED ABDOMINAL PAIN: ICD-10-CM

## 2025-06-04 DIAGNOSIS — K21.9 GASTRO-ESOPHAGEAL REFLUX DISEASE W/OUT ESOPHAGITIS: ICD-10-CM

## 2025-06-04 PROCEDURE — 99213 OFFICE O/P EST LOW 20 MIN: CPT

## 2025-06-04 RX ORDER — MEPIVACAINE HYDROCHLORIDE 10 MG/ML
INJECTION, SOLUTION INFILTRATION
Refills: 0 | Status: ACTIVE | COMMUNITY

## 2025-07-09 ENCOUNTER — APPOINTMENT (OUTPATIENT)
Dept: GASTROENTEROLOGY | Facility: CLINIC | Age: 60
End: 2025-07-09
Payer: COMMERCIAL

## 2025-07-09 VITALS
WEIGHT: 143.8 LBS | DIASTOLIC BLOOD PRESSURE: 92 MMHG | BODY MASS INDEX: 28.23 KG/M2 | SYSTOLIC BLOOD PRESSURE: 131 MMHG | HEIGHT: 60 IN

## 2025-07-09 PROBLEM — K21.9 ACID REFLUX: Status: ACTIVE | Noted: 2025-07-09

## 2025-07-09 PROBLEM — R19.7 DIARRHEA, UNSPECIFIED TYPE: Status: ACTIVE | Noted: 2025-07-09

## 2025-07-09 PROBLEM — R14.0 BLOATING: Status: ACTIVE | Noted: 2025-07-09

## 2025-07-09 PROBLEM — Z86.79 HISTORY OF HYPERTENSION: Status: RESOLVED | Noted: 2025-06-04 | Resolved: 2025-07-09

## 2025-07-09 PROCEDURE — 99214 OFFICE O/P EST MOD 30 MIN: CPT
